# Patient Record
Sex: FEMALE | Race: WHITE | URBAN - METROPOLITAN AREA
[De-identification: names, ages, dates, MRNs, and addresses within clinical notes are randomized per-mention and may not be internally consistent; named-entity substitution may affect disease eponyms.]

---

## 2023-01-03 ENCOUNTER — HOME HEALTH ADMISSION (OUTPATIENT)
Dept: HOME HEALTH SERVICES | Facility: HOME HEALTH | Age: 68
End: 2023-01-03
Payer: MEDICARE

## 2023-01-04 ENCOUNTER — HOME CARE VISIT (OUTPATIENT)
Dept: SCHEDULING | Facility: HOME HEALTH | Age: 68
End: 2023-01-04
Payer: MEDICARE

## 2023-01-04 VITALS
SYSTOLIC BLOOD PRESSURE: 93 MMHG | HEART RATE: 65 BPM | RESPIRATION RATE: 18 BRPM | OXYGEN SATURATION: 98 % | DIASTOLIC BLOOD PRESSURE: 59 MMHG | TEMPERATURE: 97.5 F

## 2023-01-04 PROCEDURE — G0299 HHS/HOSPICE OF RN EA 15 MIN: HCPCS

## 2023-01-04 PROCEDURE — G0151 HHCP-SERV OF PT,EA 15 MIN: HCPCS

## 2023-01-04 ASSESSMENT — ENCOUNTER SYMPTOMS
HEMOPTYSIS: 0
PAIN LOCATION - PAIN QUALITY: ACHY

## 2023-01-04 NOTE — HOME HEALTH
Pt is an 79 yo female s/p RTKA 1/4/23. Skilled PT intervention orders received. PMH includes hypothyroid, OA. Pt lives with her  in a 2 story house with main bedroom on first floor. Pt was independent and active without an AD prior to sx. Pt presents now using a RW for all mobility, shows decreased ambulation distance, R knee pain, bulky dressing on RLE limiting ROM, unsteady antalgic gait pattern, RLE muscle weakness, impaired dynamic balance and decreased cv endurance causing difficulty performing safe functional transfers from various surfaces throughout home, standing activities, household ambulation and community ambulation without assistance as able to do in PLOF using least restrictive AD. Pt will benefit from skilled PT intervention in order to improve functional deficits, increase R knee ROM and strength, and establish a HEP to allow pt to perform functional transfers, ambulation throughout home and community independently and safely using least restrictive AD for support. PT reviewed POC, tx frequency, tx goals, safety precautions, fall prevention strategies, safe med management and energy conservation techniques.

## 2023-01-05 ENCOUNTER — HOME CARE VISIT (OUTPATIENT)
Dept: SCHEDULING | Facility: HOME HEALTH | Age: 68
End: 2023-01-05
Payer: MEDICARE

## 2023-01-05 VITALS
TEMPERATURE: 97.6 F | SYSTOLIC BLOOD PRESSURE: 114 MMHG | DIASTOLIC BLOOD PRESSURE: 68 MMHG | OXYGEN SATURATION: 98 % | RESPIRATION RATE: 18 BRPM | HEART RATE: 59 BPM

## 2023-01-05 VITALS
TEMPERATURE: 97.5 F | SYSTOLIC BLOOD PRESSURE: 93 MMHG | OXYGEN SATURATION: 98 % | HEART RATE: 65 BPM | RESPIRATION RATE: 17 BRPM | DIASTOLIC BLOOD PRESSURE: 59 MMHG

## 2023-01-05 PROCEDURE — G0157 HHC PT ASSISTANT EA 15: HCPCS

## 2023-01-05 ASSESSMENT — ENCOUNTER SYMPTOMS
PAIN LOCATION - PAIN QUALITY: DULL, THROBBING
PAIN LOCATION - PAIN QUALITY: ACHE

## 2023-01-05 NOTE — HOME HEALTH
Patient received skilled nursing care today for admission assessment and teaching, per surgeon protocol. Patient had total knee replacement this morning. Caregiver involvement:  present and assisting with ADLs/medication regimen. Medications reconciled and all medications are available in the home this visit. Home health supplies ordered or delivered this visit: none  Patient education provided this visit:  Medication management (opioid and non-opioid pain control), taking medications as prescribed, risks associated with opioid medications  Infection control and prevention measures, S/S of infection, S/S of DVT  Home safety, using assistive device at all times, fall precautions, active cooling/elevation  Call Me First procedure, S/S to report to nurse, therapy clinician, surgeon, and that necessitate calling 911  Diet/Nutrition (need for hydration, foods high in protein), management of postop constipation as needed  Patient/caregiver response: Verbalizes understanding via the teach back method. Progress toward goals: Yes; see care plan documentation  Home exercise program: per PT  Plan for next home care visit: SN for postoperative assessment and teaching per surgeon protocol. The following discharge planning was discussed with the patient/caregiver: home care agency discharge to be completed when goals met. Patient in agreement. This patient is homebound. See Homebound Status within Visit Record.

## 2023-01-05 NOTE — HOME HEALTH
Patient was seen today for initiation of TKR HEP and therapeutic activities per protocol of Dr. Aamir Gimenez, following right total knee replacement surgery. Educated patient and her  on proper elevation of her surgery leg to reduce edema with visual reference provided, along with position changes every hour during the day to promote mobility. Discussed and educated them on signs/symptoms of infection, sigsn/symptoms of DVT and when to contact [facility] staff or MD.  Instructed patient in safe sit/stand from arm chair to RW with focus on protection of surgery leg, consistent safe UE placement, feeling for chair before attempting to sit and controlling descent to chair using increased UE support and her non-surgery leg. Patient demonstrated good follow through with cues and education and improved her technique with sit/stand on 4/5 reps today. Worked on gentle weight shifts in standing to RW and standing march. Instructed patient in RW gait training indoors to 80 ft and had her demonstrate in/out of the bathroom with her walker. Bathroom entrance is narrow and patient was instructed in taking side steps slowly with her walker to enter and exit the bathroom. Patient has a raised toilet seat in the bathroom and reports no issues with toileting. Worked on transfer on/off the bed using cloth strap to bring surgery leg onto the bed. Patient was able to transfer on/off bed and position herself in bed using the strap with SBA and 60% cuing today.  observes and is available to help with bed transfers as needed. Instructed patient in standing march and standing toe/heel raises to RW. Educated her on therapy progressions to follow, care of the surgery incision and pain management. Instructed stretches for her bilateral LE to help improve mobility and decrease pain including HS, calf and ITB stretches with cloth strap performed seated in arm chair.   Initiated quad sets for the right knee in long sitting for strengthening. Discussed frequent use of the ice machine over next few days to reduce edema and pain after right knee surgery and showed  how to place additional ice pack behind the knee with velcro strap. Written instructions for today's activities and beginning HEP prepared, provided and reviewed on today's visit. Right knee surgicial incision not visible on today's visit. Patient has ace wrap covering right LE from thigh to ankle. [facility] nurse to remove bandaging tomorrow. No new meds or med changes reported since start of care yesterday.

## 2023-01-06 ENCOUNTER — HOME CARE VISIT (OUTPATIENT)
Dept: SCHEDULING | Facility: HOME HEALTH | Age: 68
End: 2023-01-06
Payer: MEDICARE

## 2023-01-06 VITALS
RESPIRATION RATE: 18 BRPM | TEMPERATURE: 97.3 F | DIASTOLIC BLOOD PRESSURE: 73 MMHG | HEART RATE: 62 BPM | OXYGEN SATURATION: 98 % | SYSTOLIC BLOOD PRESSURE: 110 MMHG

## 2023-01-06 PROCEDURE — G0157 HHC PT ASSISTANT EA 15: HCPCS

## 2023-01-06 PROCEDURE — G0299 HHS/HOSPICE OF RN EA 15 MIN: HCPCS

## 2023-01-06 ASSESSMENT — ENCOUNTER SYMPTOMS: PAIN LOCATION - PAIN QUALITY: ACHE

## 2023-01-06 NOTE — HOME HEALTH
Patient's right knee surgical incision was not visible when Clinician arrived. Ace wrap from right thigh to ankle was in place on arrival and patient was using ice machine appropriately. [facility] nurse to visit same day to remove ace wrap and bandaging. Patient reports increased right knee pain, poor sleep and episodes of nausea. Re-educated patient on surgical healing process and controlling pain with meds as directed along with ice machine, importance of nutrition and taking pain meds with food and reporting concens to [facility] staff and MD office. Reassured patient about her progress and reviewed sigsn/symptoms of infection -- none observed today. Worked on sit/stand from arm chair with focus on slow pace and protecting surgery leg. Instructed gentle weight shifring, static standing balance and standing march. Patient reported feeling better with standing. Today, she tolerated RW gait training through the kitchen and bedroom to 90 ft with reminders to keep both feet inside the walker, especially turning and clear feet to avoid pivots and sliding. Patient's ability to clear her feet improved as she walked. Initiated standing LE ex at counter including side steps (altenating), abduction (right LE only) and hip extension (right LE only) -- rep range to 10. She reported fatigue after these activities so verbally reviewed quad sets instructed on previous visit and importance of position changes and RW gait throughout the day. Assessed shower set up in patient's main bathroom, as she is expecting to shower after bandaging is removed from right knee incision. There are 4 steps with no handrails to access bathroom with shower. Instructed  on how to assist patient with the steps, using RW with  in front going down and bracing his weight against the RW.   Showed him how to assist her going up using his UE support on her right (surgery leg) and having her use stationary file cabinet on her left for UE support going up. Written directions for steps, including lead leg were reviewed with both patient and . Recommended a grab bar for the shower, which has 1 step to enter. They are awaiting delivery of a shower chair. Patient tolerated all activities well and stated she would not attempt the shower until feeling strong enough.

## 2023-01-07 ENCOUNTER — HOME CARE VISIT (OUTPATIENT)
Dept: SCHEDULING | Facility: HOME HEALTH | Age: 68
End: 2023-01-07
Payer: MEDICARE

## 2023-01-07 VITALS
HEART RATE: 62 BPM | SYSTOLIC BLOOD PRESSURE: 112 MMHG | RESPIRATION RATE: 18 BRPM | SYSTOLIC BLOOD PRESSURE: 110 MMHG | RESPIRATION RATE: 18 BRPM | OXYGEN SATURATION: 98 % | OXYGEN SATURATION: 96 % | DIASTOLIC BLOOD PRESSURE: 69 MMHG | TEMPERATURE: 97.7 F | HEART RATE: 73 BPM | DIASTOLIC BLOOD PRESSURE: 73 MMHG | TEMPERATURE: 97.3 F

## 2023-01-07 PROCEDURE — G0299 HHS/HOSPICE OF RN EA 15 MIN: HCPCS

## 2023-01-08 ASSESSMENT — ENCOUNTER SYMPTOMS
HEMOPTYSIS: 0
NAUSEA: 1
PAIN LOCATION - PAIN QUALITY: DULL

## 2023-01-08 NOTE — HOME HEALTH
Patient received skilled nursing care today for postoperative assessment and teaching, per surgeon protocol. SN goals met. Caregiver involvement:  present and assisting with ADLs/medication regimen. Medications reconciled and all medications are available in the home this visit. Home health supplies ordered or delivered this visit: none  Patient education provided this visit:  Medication management (opioid and non-opioid pain control), taking medications as prescribed  Infection control and prevention measures, S/S of infection, S/S of DVT  Home safety, using assistive device at all times, fall precautions, active cooling/elevation  Call Me First procedure, S/S to report to therapy clinician, surgeon, and that necessitate calling 911  Diet/Nutrition (need for hydration, foods high in protein)  Patient/caregiver response: Verbalizes understanding via the teach back method. Progress toward goals: Yes; see care plan documentation  Home exercise program: per PT  Plan for next home care visit: PT to continue postoperative assessment, therapeutic exercises, and teaching per surgeon protocol. The following discharge planning was discussed with the patient/caregiver: home care agency discharge to be completed when goals met. Patient in agreement. This patient remains homebound. See Homebound Status within Visit Record.

## 2023-01-08 NOTE — HOME HEALTH
Patient received skilled nursing care today for postoperative assessment and teaching, per surgeon protocol. The surgical dressing was removed from the RLE. The incision is clean, dry, and intact. Caregiver involvement:  present and assisting with ADLs/medication regimen. Medications reconciled and all medications are available in the home this visit. Home health supplies ordered or delivered this visit: wound cleanser, gauze, gloves, skin prep, steristrips  Patient education provided this visit:  Medication management (opioid and non-opioid pain control), taking medications as prescribed  Infection control and prevention measures, S/S of infection, S/S of DVT  Home safety, using assistive device at all times, fall precautions, active cooling/elevation  Call Me First procedure, S/S to report to nurse, therapy clinician, surgeon, and that necessitate calling 911  Diet/Nutrition (need for hydration, foods high in protein), management of postop constipation as needed  Patient/caregiver response: Verbalizes understanding via the teach back method. Progress toward goals: Yes; see care plan documentation  Home exercise program: per PT  Plan for next home care visit:  for postoperative assessment and teaching per surgeon protocol. The following discharge planning was discussed with the patient/caregiver: home care agency discharge to be completed when goals met. Patient in agreement. This patient remains homebound. See Homebound Status within Visit Record.

## 2023-01-09 ENCOUNTER — HOME CARE VISIT (OUTPATIENT)
Dept: SCHEDULING | Facility: HOME HEALTH | Age: 68
End: 2023-01-09
Payer: MEDICARE

## 2023-01-09 VITALS
TEMPERATURE: 97.7 F | SYSTOLIC BLOOD PRESSURE: 115 MMHG | RESPIRATION RATE: 16 BRPM | HEART RATE: 68 BPM | DIASTOLIC BLOOD PRESSURE: 82 MMHG

## 2023-01-09 PROCEDURE — G0157 HHC PT ASSISTANT EA 15: HCPCS

## 2023-01-09 ASSESSMENT — ENCOUNTER SYMPTOMS: PAIN LOCATION - PAIN QUALITY: ACHE

## 2023-01-10 NOTE — HOME HEALTH
Right knee AROM at end of session measured (-8-80 degrees) extension measured in supine, flexion measured in seated.     Patient's right knee incision is open to air, dry, incision closed with surgical glue, no drainage observed, no unusual warmth or unusual redness.  Re-educated patient and  on signs/symptoms of infection and DVT prevention.  They verbalized understanding.  Reviewed care of the right knee surgical incision and suggested extra steri strips in case of break through bleeding.  None occurred on today's visit.  Initiated new exercises in seated of partial arc quad (modified right LE knee extension) and seated assisted flexion.  Worked on sit/stand from arm chair to RW with focus on pause for balance in standing and gentle weight shifts to assure balance with right knee.  Initiated step training with patient, along with gait training outside in rear of home which has an additional entrance to patient's home office and main bathroom with shower.  Patient did well with navigation of 4 small single steps in rear of house to get to home office through outside entrance -- SBA with 50% Vcs going down steps and LIght CGA going up steps with 50% Vcs.   is also present for the instruction.  Patient tolerated 120 ft gait training outside on back patio area with cues for slowly increasind right foot clearance, stride and weight shfit.  Instructed standing march, standing LE extension (alternating) and standing heel raises -- reps 10-12.  Initiated gentle standing right knee flexion -- reps to 10.  Performed gentle STM of right VMO and HS with patient positioned in long sitting and instructed her in right knee ROM ex in long sitting of flexion (3 sets of 10) and Quad Sets for extension  Reviewed HS/Calf stretching.  Patient tolerated all activities well with report of MIN plus fatigue.  She states prefers over the counter pain med Alieve to prescription pain meds

## 2023-01-11 ENCOUNTER — HOME CARE VISIT (OUTPATIENT)
Dept: SCHEDULING | Facility: HOME HEALTH | Age: 68
End: 2023-01-11
Payer: MEDICARE

## 2023-01-11 VITALS
TEMPERATURE: 97.8 F | DIASTOLIC BLOOD PRESSURE: 72 MMHG | OXYGEN SATURATION: 98 % | SYSTOLIC BLOOD PRESSURE: 110 MMHG | HEART RATE: 70 BPM | RESPIRATION RATE: 18 BRPM

## 2023-01-11 PROCEDURE — G0157 HHC PT ASSISTANT EA 15: HCPCS

## 2023-01-11 ASSESSMENT — ENCOUNTER SYMPTOMS: PAIN LOCATION - PAIN QUALITY: ACHE, THROBBING

## 2023-01-12 NOTE — HOME HEALTH
Right knee AAROM at end of session (-6-85 degrees). Extension measured in standing, flexion measured in reclined position. Patient reports right knee pain stable at 3-4/10 last few days. She states prefers over-the-counter Alieve for pain during the day, takes prescription pain med (perkoset) at bedtime. Patient and spouse have not proceeded with grab bars for the shower, as patient states she's not ready to attempt the shower yet. Assessed bathroom, shower set up and bathroom transfers on previous visit and recommended grab bars and entering bathroom from exterior entrance to minimize steps. Initiated increased standing LE ex today, including introduction of mini squat at counter for increased right knee flexion. Patient tolerated this well, along with 10 min of continuos standing strength/ROM ex including heel/toe raises, HS curl, marching, abduction and hip extension. Unilateral ex in standing instructed for the right LE only with rep range of 10-15. Introduced standing calf stretch and standing knee flexion stretch at counter for both legs for improved ROM. Instructed seated LE ex of partial right knee extension, sit/stand and static stretches for the HS and ITB. Worked on quality of RW ambulation indoors to 100 ft with focus on increased stride and increased right knee flexion. Patient reports walking household distances and performing indoor RW laps to tolerance frequently during the day as recommended. Performed STM to patient's right ITB and HS in recliner, followed by instruction of supine quad sets, SAQ and knee flexion (3 sets of 10). Reviewed ice machine protocol and positioning, especially after therapy and elevation of the right LE periodically throughout the day to help decrease edema.

## 2023-01-13 ENCOUNTER — HOME CARE VISIT (OUTPATIENT)
Dept: SCHEDULING | Facility: HOME HEALTH | Age: 68
End: 2023-01-13
Payer: MEDICARE

## 2023-01-13 VITALS
OXYGEN SATURATION: 98 % | HEART RATE: 61 BPM | RESPIRATION RATE: 18 BRPM | DIASTOLIC BLOOD PRESSURE: 64 MMHG | SYSTOLIC BLOOD PRESSURE: 101 MMHG | TEMPERATURE: 97.5 F

## 2023-01-13 PROCEDURE — G0157 HHC PT ASSISTANT EA 15: HCPCS

## 2023-01-13 ASSESSMENT — ENCOUNTER SYMPTOMS: PAIN LOCATION - PAIN QUALITY: ACHE, THROBBING

## 2023-01-14 NOTE — HOME HEALTH
Right knee AAROM at end of session measured (-6-90 degrees in supine). Patient states she's been more active, moving around the home with her walker this week, and reports feeling tired and \"achy\" today. Performed STM to patient's right VMO, HS and ITB at start of session for 10 min and showed her how to self-massage the areas. Instructed patient in transfer on/off bed without using her strap, by sliding hips further back onto the bed, and today she was able -- MI. Had her perform stretches for ther HS and ITB in supine on bed. Instructed supine quad sets, SAQ and knee flexion exercises in supine on bed. Reviewed SLR, but patient continues to require moderate assistance with this ex. Added standing balance ex of one-handed side steps at counter, one-handed forward/backward stepping and one handed march to patient's standing LE HEP today. She did well with these, with cues to stay close to the counter. minimize back sway and clear feet with stepping activities. Re-instructed standing HS curl, mini squat, abduction and heeltoe raises with reps increased to 15. Re-instructed seated LE ex of partical arc quad and seated right knee flexion for ROM. Patient is improving her right quad contraction and partial arc quad in seated with repetition. Instructed RW gait training through the home to 100 ft with emphasis on increasing stride and right knee flexion as she walks. Reviewed ice machine protocol, elevation of the surgery leg throughout the day and sigsn/symtoms of infection. Discussed planning for vehicle transfer training next week as patient has ortho MD follow up on 1/19/23.

## 2023-01-16 ENCOUNTER — HOME CARE VISIT (OUTPATIENT)
Dept: SCHEDULING | Facility: HOME HEALTH | Age: 68
End: 2023-01-16
Payer: MEDICARE

## 2023-01-16 PROCEDURE — G0157 HHC PT ASSISTANT EA 15: HCPCS

## 2023-01-17 VITALS
TEMPERATURE: 97.8 F | OXYGEN SATURATION: 96 % | RESPIRATION RATE: 18 BRPM | DIASTOLIC BLOOD PRESSURE: 62 MMHG | SYSTOLIC BLOOD PRESSURE: 96 MMHG | HEART RATE: 73 BPM

## 2023-01-17 ASSESSMENT — ENCOUNTER SYMPTOMS: PAIN LOCATION - PAIN QUALITY: ACHE

## 2023-01-17 NOTE — HOME HEALTH
Right knee AAROM at end of session measured (-5-90 degrees) in supine. Patient states right knee pain is gradually improving, rates it as 2/10 on arrival.  She reports less compliant with TKR HEP over the weekend. Encouraged her to perform her HEP 3x daily for continued progress and she verbalized understanding. No signs/symptoms of infection with her right knee surgical incision and swelling appears to be decreased from previous visit, bruising behind the knee is also fading. Instructed RW gait training indoors/outdoors for warm up, including 2 steps at front door (1 step appears deeper than 4 inches). Patient did well on the steps using her RW with CGA for safety, demonstrates good recall of lead leg with steps, Vcs for positioning of self and walker with steps and re-education on where spouse should guard her with steps. Patient tolerated 125 ft RW gait training with 50% cues for increased heel strike, right knee flexion and stride. These improved at end of session after ex. Instructed seated partial arc quads and stretches for her HS and ITB followed by manual assistance with right knee flexion in seated. Worked on standing balance with counter walking forward/backward in kitchen with one hand support and discussed preparing for transition to [place] soon. Instructed standing LE HEP with reps increased to 15 for all ex except mini squat. Tolerates reps to 10 on mini squat with 5 sec holds added. Patient is now able to alternate legs with the standing LE ex for increased right WB. Performed STM to patient's right HS, ITB and VMO in supine for 10 min. Instructed her in supine TKR ex, including SLR for the right LE which she was previously unable to perform without assistance. Right quad contraction is slowly improving. Patient requires moderate encouragment today with knee flexion exercises but was able to reach 90 degrees in supine by end of session. Has follow up with ortho MD Gregg sAcencio on 1/19/23.

## 2023-01-18 ENCOUNTER — HOME CARE VISIT (OUTPATIENT)
Dept: SCHEDULING | Facility: HOME HEALTH | Age: 68
End: 2023-01-18
Payer: MEDICARE

## 2023-01-18 VITALS
TEMPERATURE: 97.1 F | HEART RATE: 61 BPM | OXYGEN SATURATION: 97 % | SYSTOLIC BLOOD PRESSURE: 105 MMHG | DIASTOLIC BLOOD PRESSURE: 71 MMHG | RESPIRATION RATE: 18 BRPM

## 2023-01-18 PROCEDURE — G0157 HHC PT ASSISTANT EA 15: HCPCS

## 2023-01-18 ASSESSMENT — ENCOUNTER SYMPTOMS: PAIN LOCATION - PAIN QUALITY: ACHE

## 2023-01-19 NOTE — HOME HEALTH
Right knee AAROM at end of session measured (-2-97) degrees. Extension measured in standing. Flexion measured in supine. Progressed patient to [place] for household distances. Patient sees ortho MD Michelle Chaves for follow up on 1/19/23. Recommended patient wear supportive footwear for gait training and progressed her to [place] after warm up with RW. With shoes patient displayed improved stride and weight shift -- still needs cues for increased right knee flexion with ambulation, along with right heel strike. Assessed and adjusted newly purchased SC for her. Following instruction of SC sequencing, she demonstrated good follow through at slow pace and was able to ambulate through the home including threshold changes with no LOB or near LOB. Patient reported \"it feels good,\" walking with SC. Instructed her in step training with SC at front entrance, including switching hands so she can also hold onto nearby support at the front steps. Patient was SBA with Vcs for the steps using the SC and able to ambulate on driveway with uneven pavers for about 100 ft using SC with SBA. Instructed vehicle transfer into passenger side of her SUV using SC -- SBA/CGA. Discussed having spouse guard her closely with the vehicle transfer as she was CGA getting out of the SUV which sits up high. Recommended she also take the RW for tomorrow's MD appointment and continue with RW for distance ambulation, especially outside the house. Discussed practising with SC indoors and switching to RW when tired. Instructed full HEP for TKR including seated, standing and supine exercises. Patient needs moderate motivation to perform her program and for recommended compliance of 3x daily with the exercises. Demonstrates improving tolerance for the standing ex with less fatigue at rep range of 12-15 and ability to alternate legs on unilateral exercises, putting increased weight on her surgery leg.   Her control of the eccentric phase of supine SLR is improving and ROM with seated partical arch quad is increased. Patient reports able to shower with assistance from spouse this week, and that they removed shower door so she could enter the shower with her walker. No signs/symptoms of infection observed with right knee surgical incision. Edema around the right knee appears decreased from previous visit. Patient reports good compliance with ice machine and frequent elevation of her right LE.

## 2023-01-20 ENCOUNTER — HOME CARE VISIT (OUTPATIENT)
Dept: SCHEDULING | Facility: HOME HEALTH | Age: 68
End: 2023-01-20
Payer: MEDICARE

## 2023-01-20 PROCEDURE — G0151 HHCP-SERV OF PT,EA 15 MIN: HCPCS

## 2023-01-22 VITALS
RESPIRATION RATE: 19 BRPM | DIASTOLIC BLOOD PRESSURE: 64 MMHG | HEART RATE: 74 BPM | TEMPERATURE: 97.4 F | OXYGEN SATURATION: 97 % | SYSTOLIC BLOOD PRESSURE: 102 MMHG

## 2023-01-22 ASSESSMENT — ENCOUNTER SYMPTOMS
PAIN LOCATION - PAIN QUALITY: ACHE, DULL
HEMOPTYSIS: 0

## 2023-01-22 NOTE — HOME HEALTH
Pt has shown good progress toward POC, meeting all goals stated. Pt presents with improved RLE strength scoring a 4-/5 on MMT, increased R knee ROM of 90 degrees flex and -7 degrees ext, improved dynamic balance scoring a 24/28 on Tinetti test and increased cv endurance scoring 2/10 on modified Michael test allowing her to perform household ambulation without an AD, short distance community ambulation with sc, functional transfers from various surfaces and standing activities without difficulty. Pt able to perform all functional tasks safely with MI. Pt shows good carryover and understanding to all instructions through the teach back method. PT reviewed HEP, safety precautions, pain management, infection control, fall prevention techniques, energy conservation techniques, safe med management and proper body mechanics. Pt continues to be very active with daily activities and is scheduled to initiate OP PT next week. .  Will dc PT services.